# Patient Record
Sex: MALE | Race: WHITE | Employment: FULL TIME | URBAN - METROPOLITAN AREA
[De-identification: names, ages, dates, MRNs, and addresses within clinical notes are randomized per-mention and may not be internally consistent; named-entity substitution may affect disease eponyms.]

---

## 2017-10-03 ENCOUNTER — HOSPITAL ENCOUNTER (OUTPATIENT)
Dept: SLEEP MEDICINE | Age: 42
Discharge: HOME | End: 2017-10-03
Payer: COMMERCIAL

## 2017-10-03 PROCEDURE — 95806 SLEEP STUDY UNATT&RESP EFFT: CPT

## 2021-01-26 PROBLEM — N40.0 BENIGN PROSTATIC HYPERPLASIA WITHOUT LOWER URINARY TRACT SYMPTOMS: Status: ACTIVE | Noted: 2021-01-26

## 2022-03-20 PROBLEM — N40.0 BENIGN PROSTATIC HYPERPLASIA WITHOUT LOWER URINARY TRACT SYMPTOMS: Status: ACTIVE | Noted: 2021-01-26

## 2022-04-07 PROBLEM — Z98.890 HISTORY OF COLONOSCOPY: Status: ACTIVE | Noted: 2022-04-07

## 2022-04-11 PROBLEM — Z98.890 HISTORY OF COLONOSCOPY: Status: ACTIVE | Noted: 2022-04-07

## 2023-02-20 SDOH — ECONOMIC STABILITY: TRANSPORTATION INSECURITY
IN THE PAST 12 MONTHS, HAS LACK OF TRANSPORTATION KEPT YOU FROM MEETINGS, WORK, OR FROM GETTING THINGS NEEDED FOR DAILY LIVING?: NO

## 2023-02-20 SDOH — ECONOMIC STABILITY: FOOD INSECURITY: WITHIN THE PAST 12 MONTHS, THE FOOD YOU BOUGHT JUST DIDN'T LAST AND YOU DIDN'T HAVE MONEY TO GET MORE.: NEVER TRUE

## 2023-02-20 SDOH — ECONOMIC STABILITY: INCOME INSECURITY: HOW HARD IS IT FOR YOU TO PAY FOR THE VERY BASICS LIKE FOOD, HOUSING, MEDICAL CARE, AND HEATING?: NOT HARD AT ALL

## 2023-02-20 SDOH — ECONOMIC STABILITY: FOOD INSECURITY: WITHIN THE PAST 12 MONTHS, YOU WORRIED THAT YOUR FOOD WOULD RUN OUT BEFORE YOU GOT MONEY TO BUY MORE.: NEVER TRUE

## 2023-02-20 SDOH — ECONOMIC STABILITY: HOUSING INSECURITY
IN THE LAST 12 MONTHS, WAS THERE A TIME WHEN YOU DID NOT HAVE A STEADY PLACE TO SLEEP OR SLEPT IN A SHELTER (INCLUDING NOW)?: NO

## 2023-02-23 ENCOUNTER — OFFICE VISIT (OUTPATIENT)
Dept: FAMILY MEDICINE CLINIC | Facility: CLINIC | Age: 48
End: 2023-02-23
Payer: COMMERCIAL

## 2023-02-23 VITALS
HEIGHT: 69 IN | HEART RATE: 69 BPM | OXYGEN SATURATION: 98 % | DIASTOLIC BLOOD PRESSURE: 69 MMHG | WEIGHT: 161.8 LBS | BODY MASS INDEX: 23.96 KG/M2 | SYSTOLIC BLOOD PRESSURE: 109 MMHG

## 2023-02-23 DIAGNOSIS — N40.0 BENIGN PROSTATIC HYPERPLASIA WITHOUT LOWER URINARY TRACT SYMPTOMS: Primary | ICD-10-CM

## 2023-02-23 DIAGNOSIS — E78.00 PURE HYPERCHOLESTEROLEMIA: ICD-10-CM

## 2023-02-23 LAB — PSA SERPL-MCNC: 0.5 NG/ML

## 2023-02-23 PROCEDURE — 99213 OFFICE O/P EST LOW 20 MIN: CPT | Performed by: FAMILY MEDICINE

## 2023-02-23 ASSESSMENT — PATIENT HEALTH QUESTIONNAIRE - PHQ9
SUM OF ALL RESPONSES TO PHQ9 QUESTIONS 1 & 2: 0
SUM OF ALL RESPONSES TO PHQ QUESTIONS 1-9: 0
SUM OF ALL RESPONSES TO PHQ QUESTIONS 1-9: 0
1. LITTLE INTEREST OR PLEASURE IN DOING THINGS: 0
SUM OF ALL RESPONSES TO PHQ QUESTIONS 1-9: 0
2. FEELING DOWN, DEPRESSED OR HOPELESS: 0
SUM OF ALL RESPONSES TO PHQ QUESTIONS 1-9: 0

## 2023-02-23 NOTE — PROGRESS NOTES
Subjective:   Dominick Cleary is a 49 y.o. male presents today for annual prostate exam and lipid profile and refills  AUA and Testosterone deficiency surveys considered today. PHQ-9 Total Score: 0 (2/23/2023  7:44 AM)    No Known Allergies  Lab Results   Component Value Date    PSA 0.6 02/24/2022    PSA 0.6 01/26/2021          Objective:   Blood pressure 109/69, pulse 69, height 5' 9\" (1.753 m), weight 161 lb 12.8 oz (73.4 kg), SpO2 98 %. General- Pleasant and no distress  Psych- alert and oriented to person, place and time  Mood and affect are appropriate to situation  Musculoskeletal - Gait and station examination reveals mid-position with no abnormalities. Neurological- grossly intact. rrr s mrg.   bcta  Prostate of increased size and without asymmetry, nodules, or masses. He has good rectal tone with light brown stool. Some of today's visit is spent counseling with review of symptoms, disposition, prognosis and treatment plan options in addition to limited behavioral counseling and recommendations regarding an enlarged prostate and possible affects of low testosterone. Assessment/Plan:     1. Benign prostatic hyperplasia without lower urinary tract symptoms    2. Pure hypercholesterolemia        Return in one year for annual prostate exam. We will call you back if labs are abnormal.    1. Benign prostatic hyperplasia without lower urinary tract symptoms  -     PSA, Diagnostic; Future  2. Pure hypercholesterolemia  -     Lipid Panel; Future      Lab Results   Component Value Date/Time    PSA 0.6 02/24/2022 08:53 AM    PSA 0.6 01/26/2021 08:18 AM      Followup:   No follow-ups on file.

## 2023-02-24 LAB
CHOLEST SERPL-MCNC: 144 MG/DL
HDLC SERPL-MCNC: 65 MG/DL (ref 40–60)
HDLC SERPL: 2.2
LDLC SERPL CALC-MCNC: 66 MG/DL
TRIGL SERPL-MCNC: 65 MG/DL (ref 35–150)
VLDLC SERPL CALC-MCNC: 13 MG/DL (ref 6–23)

## 2023-04-18 ENCOUNTER — TELEPHONE (OUTPATIENT)
Dept: FAMILY MEDICINE CLINIC | Facility: CLINIC | Age: 48
End: 2023-04-18

## 2023-04-18 NOTE — TELEPHONE ENCOUNTER
Pt requests a refill of Rosuvastatin 10 mg.     Pharmacy - [facility] at [address] [place] [address],[address] [place]

## 2023-04-20 RX ORDER — ROSUVASTATIN CALCIUM 10 MG/1
10 TABLET, COATED ORAL DAILY
Qty: 90 TABLET | Refills: 3 | Status: SHIPPED | OUTPATIENT
Start: 2023-04-20

## 2024-02-19 ASSESSMENT — PATIENT HEALTH QUESTIONNAIRE - PHQ9
SUM OF ALL RESPONSES TO PHQ QUESTIONS 1-9: 0
1. LITTLE INTEREST OR PLEASURE IN DOING THINGS: 0
1. LITTLE INTEREST OR PLEASURE IN DOING THINGS: NOT AT ALL
SUM OF ALL RESPONSES TO PHQ9 QUESTIONS 1 & 2: 0
2. FEELING DOWN, DEPRESSED OR HOPELESS: 0
SUM OF ALL RESPONSES TO PHQ QUESTIONS 1-9: 0
2. FEELING DOWN, DEPRESSED OR HOPELESS: NOT AT ALL
SUM OF ALL RESPONSES TO PHQ QUESTIONS 1-9: 0
SUM OF ALL RESPONSES TO PHQ QUESTIONS 1-9: 0
SUM OF ALL RESPONSES TO PHQ9 QUESTIONS 1 & 2: 0

## 2024-02-22 ENCOUNTER — OFFICE VISIT (OUTPATIENT)
Dept: FAMILY MEDICINE CLINIC | Facility: CLINIC | Age: 49
End: 2024-02-22
Payer: COMMERCIAL

## 2024-02-22 VITALS
HEART RATE: 64 BPM | BODY MASS INDEX: 25.33 KG/M2 | WEIGHT: 171 LBS | TEMPERATURE: 97.2 F | DIASTOLIC BLOOD PRESSURE: 87 MMHG | SYSTOLIC BLOOD PRESSURE: 130 MMHG | OXYGEN SATURATION: 99 % | HEIGHT: 69 IN | RESPIRATION RATE: 16 BRPM

## 2024-02-22 DIAGNOSIS — N40.0 BENIGN PROSTATIC HYPERPLASIA WITHOUT LOWER URINARY TRACT SYMPTOMS: Primary | ICD-10-CM

## 2024-02-22 DIAGNOSIS — E78.00 PURE HYPERCHOLESTEROLEMIA: ICD-10-CM

## 2024-02-22 LAB
ALBUMIN SERPL-MCNC: 4.4 G/DL (ref 3.5–5)
ALBUMIN/GLOB SERPL: 1.4 (ref 0.4–1.6)
ALP SERPL-CCNC: 116 U/L (ref 50–136)
ALT SERPL-CCNC: 39 U/L (ref 12–65)
AST SERPL-CCNC: 15 U/L (ref 15–37)
BILIRUB DIRECT SERPL-MCNC: 0.2 MG/DL
BILIRUB SERPL-MCNC: 1.1 MG/DL (ref 0.2–1.1)
CHOLEST SERPL-MCNC: 151 MG/DL
GLOBULIN SER CALC-MCNC: 3.1 G/DL (ref 2.8–4.5)
HDLC SERPL-MCNC: 58 MG/DL (ref 40–60)
HDLC SERPL: 2.6
LDLC SERPL CALC-MCNC: 64 MG/DL
PROT SERPL-MCNC: 7.5 G/DL (ref 6.3–8.2)
PSA SERPL-MCNC: 0.7 NG/ML
TRIGL SERPL-MCNC: 145 MG/DL (ref 35–150)
VLDLC SERPL CALC-MCNC: 29 MG/DL (ref 6–23)

## 2024-02-22 PROCEDURE — 99213 OFFICE O/P EST LOW 20 MIN: CPT | Performed by: FAMILY MEDICINE

## 2024-02-22 RX ORDER — ROSUVASTATIN CALCIUM 10 MG/1
10 TABLET, COATED ORAL DAILY
Qty: 90 TABLET | Refills: 3 | Status: SHIPPED | OUTPATIENT
Start: 2024-02-22

## 2024-02-22 SDOH — ECONOMIC STABILITY: FOOD INSECURITY: WITHIN THE PAST 12 MONTHS, THE FOOD YOU BOUGHT JUST DIDN'T LAST AND YOU DIDN'T HAVE MONEY TO GET MORE.: NEVER TRUE

## 2024-02-22 SDOH — ECONOMIC STABILITY: FOOD INSECURITY: WITHIN THE PAST 12 MONTHS, YOU WORRIED THAT YOUR FOOD WOULD RUN OUT BEFORE YOU GOT MONEY TO BUY MORE.: NEVER TRUE

## 2024-02-22 SDOH — ECONOMIC STABILITY: INCOME INSECURITY: HOW HARD IS IT FOR YOU TO PAY FOR THE VERY BASICS LIKE FOOD, HOUSING, MEDICAL CARE, AND HEATING?: NOT VERY HARD

## 2024-02-22 NOTE — PROGRESS NOTES
Subjective:   Santhosh Lorenz is a 48 y.o. male presents today for annual prostate exam and annual lipids.    No Known Allergies  Lab Results   Component Value Date    PSA 0.5 02/23/2023    PSA 0.6 02/24/2022    PSA 0.6 01/26/2021     PHQ-9 Total Score: 0 (2/19/2024  8:34 AM)         Objective:   Blood pressure 130/87, pulse 64, temperature 97.2 °F (36.2 °C), temperature source Temporal, resp. rate 16, height 1.753 m (5' 9\"), weight 77.6 kg (171 lb), SpO2 99 %.  General- Pleasant and no distress  Psych- alert and oriented to person, place and time  Mood and affect are appropriate to situation  Musculoskeletal - Gait and station examination reveals mid-position with no abnormalities.  Neurological- grossly intact.   rrr s mrg.   bcta  Prostate of increased size and without asymmetry, nodules, or masses.  He has good rectal tone with light brown stool.  Some of today's visit is spent counseling with review of symptoms, disposition, prognosis and treatment plan options in addition to limited behavioral counseling and recommendations regarding an enlarged prostate and possible affects of low testosterone.    Assessment/Plan:     1. Benign prostatic hyperplasia without lower urinary tract symptoms    2. Pure hypercholesterolemia        Return in one year for annual prostate exam. We will call you back if labs are abnormal.    1. Benign prostatic hyperplasia without lower urinary tract symptoms  -     PSA, Diagnostic; Future  2. Pure hypercholesterolemia  -     rosuvastatin (CRESTOR) 10 MG tablet; Take 1 tablet by mouth daily, Disp-90 tablet, R-3Normal  -     Lipid Panel; Future  -     Hepatic Function Panel; Future      Lab Results   Component Value Date/Time    PSA 0.5 02/23/2023 08:04 AM    PSA 0.6 02/24/2022 08:53 AM    PSA 0.6 01/26/2021 08:18 AM      Followup:   Return in about 1 year (around 2/22/2025), or annual prostate, lipids.

## 2025-02-27 ENCOUNTER — OFFICE VISIT (OUTPATIENT)
Dept: FAMILY MEDICINE CLINIC | Facility: CLINIC | Age: 50
End: 2025-02-27
Payer: COMMERCIAL

## 2025-02-27 VITALS
SYSTOLIC BLOOD PRESSURE: 122 MMHG | DIASTOLIC BLOOD PRESSURE: 76 MMHG | TEMPERATURE: 98.4 F | HEIGHT: 69 IN | OXYGEN SATURATION: 96 % | RESPIRATION RATE: 16 BRPM | BODY MASS INDEX: 25.77 KG/M2 | WEIGHT: 174 LBS | HEART RATE: 75 BPM

## 2025-02-27 DIAGNOSIS — E78.00 PURE HYPERCHOLESTEROLEMIA: ICD-10-CM

## 2025-02-27 DIAGNOSIS — N40.0 BENIGN PROSTATIC HYPERPLASIA WITHOUT LOWER URINARY TRACT SYMPTOMS: Primary | ICD-10-CM

## 2025-02-27 DIAGNOSIS — L82.1 KERATOSIS, SEBORRHEIC: ICD-10-CM

## 2025-02-27 PROCEDURE — 99214 OFFICE O/P EST MOD 30 MIN: CPT | Performed by: FAMILY MEDICINE

## 2025-02-27 SDOH — ECONOMIC STABILITY: FOOD INSECURITY: WITHIN THE PAST 12 MONTHS, YOU WORRIED THAT YOUR FOOD WOULD RUN OUT BEFORE YOU GOT MONEY TO BUY MORE.: NEVER TRUE

## 2025-02-27 SDOH — ECONOMIC STABILITY: FOOD INSECURITY: WITHIN THE PAST 12 MONTHS, THE FOOD YOU BOUGHT JUST DIDN'T LAST AND YOU DIDN'T HAVE MONEY TO GET MORE.: NEVER TRUE

## 2025-02-27 SDOH — ECONOMIC STABILITY: TRANSPORTATION INSECURITY
IN THE PAST 12 MONTHS, HAS THE LACK OF TRANSPORTATION KEPT YOU FROM MEDICAL APPOINTMENTS OR FROM GETTING MEDICATIONS?: NO

## 2025-02-27 SDOH — ECONOMIC STABILITY: INCOME INSECURITY: IN THE LAST 12 MONTHS, WAS THERE A TIME WHEN YOU WERE NOT ABLE TO PAY THE MORTGAGE OR RENT ON TIME?: NO

## 2025-02-27 ASSESSMENT — PATIENT HEALTH QUESTIONNAIRE - PHQ9
SUM OF ALL RESPONSES TO PHQ QUESTIONS 1-9: 0
1. LITTLE INTEREST OR PLEASURE IN DOING THINGS: NOT AT ALL
SUM OF ALL RESPONSES TO PHQ9 QUESTIONS 1 & 2: 0
2. FEELING DOWN, DEPRESSED OR HOPELESS: NOT AT ALL
2. FEELING DOWN, DEPRESSED OR HOPELESS: NOT AT ALL
SUM OF ALL RESPONSES TO PHQ9 QUESTIONS 1 & 2: 0
1. LITTLE INTEREST OR PLEASURE IN DOING THINGS: NOT AT ALL
SUM OF ALL RESPONSES TO PHQ QUESTIONS 1-9: 0

## 2025-02-27 NOTE — PROGRESS NOTES
Subjective:   Santhosh Lorenz is a 49 y.o. male presents today for follow-up of hypercholesterolemia, annual PSA, he has some skin lesions that are bothering him on his back.    He had been on Crestor in the past.  Is taking himself off those given concerns about statins.  Has never had a coronary artery calcium screen.  There is no significant premature coronary artery disease in the family.    No Known Allergies  PMH, MEDS reviewed  PHQ-9 Total Score: 0 (2/27/2025  6:12 AM)       Objective:   Blood pressure 122/76, pulse 75, temperature 98.4 °F (36.9 °C), resp. rate 16, height 1.753 m (5' 9\"), weight 78.9 kg (174 lb), SpO2 96%.  General- Pleasant and no distress  Psych- alert and oriented to person, place and time  Mood and affect are appropriate to situation  Musculoskeletal - Gait and station examination reveals mid-position with no abnormalities.  Neurological- grossly intact.   rrr s mrg.  No carotid bruits.  bcta  Back reveals two 1 x 1.5 cm waxy papules consistent with seborrheic keratosis.  Assessment/Plan:     1. Benign prostatic hyperplasia without lower urinary tract symptoms    2. Pure hypercholesterolemia    3. Keratosis, seborrheic         1. Benign prostatic hyperplasia without lower urinary tract symptoms  -     PSA, Diagnostic; Future  2. Pure hypercholesterolemia  -     Lipid Panel; Future  -     CT CARDIAC CALCIUM SCORING; Future  3. Keratosis, seborrheic    While on Crestor his LDL cholesterol was consistently in the 60s and 70s.  He has discontinued that medication.  Will follow-up on today's lipid profile but we will order a baseline CAC score.  Return to clinic for cryotherapy back lesions.  He had an area of concern on the back of the scalp with some patchy hair loss but it would appear to be more traction related or even hereditary but given its location forward to increase or worsen he would need referred to Derm  Followup:   Return soon for skin freeze. 1 year for annual visit.

## 2025-02-28 LAB
CHOLEST SERPL-MCNC: 213 MG/DL (ref 0–200)
HDLC SERPL-MCNC: 43 MG/DL (ref 40–60)
HDLC SERPL: 5 (ref 0–5)
LDLC SERPL CALC-MCNC: 153 MG/DL (ref 0–100)
PSA SERPL-MCNC: 1.1 NG/ML (ref 0–4)
TRIGL SERPL-MCNC: 87 MG/DL (ref 0–150)
VLDLC SERPL CALC-MCNC: 17 MG/DL (ref 6–23)

## 2025-03-04 ENCOUNTER — OFFICE VISIT (OUTPATIENT)
Dept: FAMILY MEDICINE CLINIC | Facility: CLINIC | Age: 50
End: 2025-03-04

## 2025-03-04 VITALS
HEART RATE: 76 BPM | TEMPERATURE: 97.5 F | RESPIRATION RATE: 16 BRPM | WEIGHT: 177 LBS | SYSTOLIC BLOOD PRESSURE: 123 MMHG | BODY MASS INDEX: 26.22 KG/M2 | HEIGHT: 69 IN | OXYGEN SATURATION: 96 % | DIASTOLIC BLOOD PRESSURE: 74 MMHG

## 2025-03-04 DIAGNOSIS — L82.0 INFLAMED SEBORRHEIC KERATOSIS: Primary | ICD-10-CM

## 2025-03-04 NOTE — PROGRESS NOTES
Subjective:  Santhosh Lorenz is a 49 y.o. male presents today for examination and treatment of brown scaly lesions that have been present for months or maybe years and would like these removed because they are bothersome, get snagged easily, scabby chest and back. Some seem to have grown more in the past year.  No Known Allergies  PMH, MEDS reviewed    Objective:  Blood pressure 123/74, pulse 76, temperature 97.5 °F (36.4 °C), resp. rate 16, height 1.753 m (5' 9\"), weight 80.3 kg (177 lb), SpO2 96%.  General- pleasant, no distress  Psych- alert and oriented to person, place and time  Mood and affect are appropriate to the visit  rrr s mrg. bcta  Skin reveals waxy papules present on chest, two on back.  And each are subjected to 20-30 second freeze thaw cycles for destruction with cryotherapy    Assessment:  1. Inflamed seborrheic keratosis      Plan:   The etiology, and prognosis of these lesions as well as what to expect with the cryotherapy is discussed.  These areas will blister and possibly scab and then fall off in a week or two.  1. Inflamed seborrheic keratosis  -     DESTRUC BENIGN LESION, UP TO 14 LESIONS    1. Inflamed seborrheic keratosis  -     DESTRUC BENIGN LESION, UP TO 14 LESIONS      Followup:  As needed.   Return if any lesions come back or fail to disappear.  No follow-ups on file.

## 2025-03-07 DIAGNOSIS — E78.00 PURE HYPERCHOLESTEROLEMIA: ICD-10-CM

## 2025-03-07 RX ORDER — ROSUVASTATIN CALCIUM 10 MG/1
10 TABLET, COATED ORAL DAILY
Qty: 90 TABLET | Refills: 3 | Status: SHIPPED | OUTPATIENT
Start: 2025-03-07

## 2025-03-07 NOTE — TELEPHONE ENCOUNTER
----- Message from Dr. Leonel Martinez MD sent at 3/7/2025  9:57 AM EST -----  Call/Notify calcium score is 60.  This is enough evidence that he should be on the Crestor.  Sorry but I think we should resume the Crestor.  Please call in